# Patient Record
Sex: FEMALE | Race: WHITE | NOT HISPANIC OR LATINO | Employment: FULL TIME
[De-identification: names, ages, dates, MRNs, and addresses within clinical notes are randomized per-mention and may not be internally consistent; named-entity substitution may affect disease eponyms.]

---

## 2017-10-08 ENCOUNTER — HEALTH MAINTENANCE LETTER (OUTPATIENT)
Age: 22
End: 2017-10-08

## 2018-03-30 LAB
C TRACH DNA SPEC QL PROBE+SIG AMP: NORMAL
N GONORRHOEA DNA SPEC QL PROBE+SIG AMP: NORMAL
PAP-ABSTRACT: NORMAL
SPECIMEN DESCRIP: NORMAL
SPECIMEN DESCRIPTION: NORMAL

## 2018-07-09 ENCOUNTER — OFFICE VISIT (OUTPATIENT)
Dept: FAMILY MEDICINE | Facility: CLINIC | Age: 23
End: 2018-07-09
Payer: COMMERCIAL

## 2018-07-09 VITALS
HEIGHT: 66 IN | OXYGEN SATURATION: 100 % | HEART RATE: 92 BPM | RESPIRATION RATE: 20 BRPM | BODY MASS INDEX: 20.31 KG/M2 | TEMPERATURE: 98.4 F | SYSTOLIC BLOOD PRESSURE: 139 MMHG | WEIGHT: 126.4 LBS | DIASTOLIC BLOOD PRESSURE: 92 MMHG

## 2018-07-09 DIAGNOSIS — Z11.3 SCREENING EXAMINATION FOR VENEREAL DISEASE: ICD-10-CM

## 2018-07-09 DIAGNOSIS — F41.0 PANIC ATTACK: ICD-10-CM

## 2018-07-09 DIAGNOSIS — F99 INSOMNIA DUE TO OTHER MENTAL DISORDER: ICD-10-CM

## 2018-07-09 DIAGNOSIS — F51.05 INSOMNIA DUE TO OTHER MENTAL DISORDER: ICD-10-CM

## 2018-07-09 DIAGNOSIS — F90.2 ATTENTION DEFICIT HYPERACTIVITY DISORDER (ADHD), COMBINED TYPE: Primary | ICD-10-CM

## 2018-07-09 LAB
AMPHETAMINES UR QL: ABNORMAL NG/ML
BARBITURATES UR QL SCN: NOT DETECTED NG/ML
BENZODIAZ UR QL SCN: NOT DETECTED NG/ML
BUPRENORPHINE UR QL: NOT DETECTED NG/ML
CANNABINOIDS UR QL: NOT DETECTED NG/ML
COCAINE UR QL SCN: NOT DETECTED NG/ML
D-METHAMPHET UR QL: NOT DETECTED NG/ML
METHADONE UR QL SCN: NOT DETECTED NG/ML
OPIATES UR QL SCN: NOT DETECTED NG/ML
OXYCODONE UR QL SCN: NOT DETECTED NG/ML
PCP UR QL SCN: NOT DETECTED NG/ML
PROPOXYPH UR QL: NOT DETECTED NG/ML
TRICYCLICS UR QL SCN: NOT DETECTED NG/ML

## 2018-07-09 PROCEDURE — 80306 DRUG TEST PRSMV INSTRMNT: CPT | Performed by: NURSE PRACTITIONER

## 2018-07-09 PROCEDURE — 99214 OFFICE O/P EST MOD 30 MIN: CPT | Performed by: NURSE PRACTITIONER

## 2018-07-09 RX ORDER — HYDROXYZINE HYDROCHLORIDE 25 MG/1
25-50 TABLET, FILM COATED ORAL EVERY 6 HOURS PRN
Qty: 60 TABLET | Refills: 1 | Status: SHIPPED | OUTPATIENT
Start: 2018-07-09 | End: 2022-02-09

## 2018-07-09 RX ORDER — DEXTROAMPHETAMINE SACCHARATE, AMPHETAMINE ASPARTATE, DEXTROAMPHETAMINE SULFATE AND AMPHETAMINE SULFATE 7.5; 7.5; 7.5; 7.5 MG/1; MG/1; MG/1; MG/1
30 TABLET ORAL 2 TIMES DAILY
Qty: 60 TABLET | Refills: 0 | Status: SHIPPED | OUTPATIENT
Start: 2018-08-08 | End: 2018-09-07

## 2018-07-09 RX ORDER — DEXTROAMPHETAMINE SACCHARATE, AMPHETAMINE ASPARTATE, DEXTROAMPHETAMINE SULFATE AND AMPHETAMINE SULFATE 5; 5; 5; 5 MG/1; MG/1; MG/1; MG/1
20 TABLET ORAL DAILY
Qty: 30 TABLET | Refills: 0 | Status: SHIPPED | OUTPATIENT
Start: 2018-07-09 | End: 2018-08-08

## 2018-07-09 RX ORDER — DEXTROAMPHETAMINE SACCHARATE, AMPHETAMINE ASPARTATE, DEXTROAMPHETAMINE SULFATE AND AMPHETAMINE SULFATE 5; 5; 5; 5 MG/1; MG/1; MG/1; MG/1
20 TABLET ORAL DAILY
Qty: 30 TABLET | Refills: 0 | Status: SHIPPED | OUTPATIENT
Start: 2018-08-09 | End: 2018-09-08

## 2018-07-09 RX ORDER — DEXTROAMPHETAMINE SACCHARATE, AMPHETAMINE ASPARTATE, DEXTROAMPHETAMINE SULFATE AND AMPHETAMINE SULFATE 7.5; 7.5; 7.5; 7.5 MG/1; MG/1; MG/1; MG/1
30 TABLET ORAL 2 TIMES DAILY
Qty: 60 TABLET | Refills: 0 | Status: SHIPPED | OUTPATIENT
Start: 2018-07-09 | End: 2018-08-08

## 2018-07-09 RX ORDER — ZOLPIDEM TARTRATE 5 MG/1
5 TABLET ORAL
Qty: 30 TABLET | Refills: 1 | Status: SHIPPED | OUTPATIENT
Start: 2018-07-09 | End: 2018-11-14

## 2018-07-09 RX ORDER — DEXTROAMPHETAMINE SACCHARATE, AMPHETAMINE ASPARTATE, DEXTROAMPHETAMINE SULFATE AND AMPHETAMINE SULFATE 5; 5; 5; 5 MG/1; MG/1; MG/1; MG/1
20 TABLET ORAL DAILY
Qty: 30 TABLET | Refills: 0 | Status: SHIPPED | OUTPATIENT
Start: 2018-09-09 | End: 2018-10-09

## 2018-07-09 RX ORDER — DEXTROAMPHETAMINE SACCHARATE, AMPHETAMINE ASPARTATE, DEXTROAMPHETAMINE SULFATE AND AMPHETAMINE SULFATE 7.5; 7.5; 7.5; 7.5 MG/1; MG/1; MG/1; MG/1
30 TABLET ORAL 2 TIMES DAILY
Qty: 60 TABLET | Refills: 0 | Status: SHIPPED | OUTPATIENT
Start: 2018-09-07 | End: 2018-10-07

## 2018-07-09 RX ORDER — ZOLPIDEM TARTRATE 5 MG/1
TABLET ORAL
Refills: 1 | COMMUNITY
Start: 2018-05-27 | End: 2018-07-09

## 2018-07-09 ASSESSMENT — ANXIETY QUESTIONNAIRES
IF YOU CHECKED OFF ANY PROBLEMS ON THIS QUESTIONNAIRE, HOW DIFFICULT HAVE THESE PROBLEMS MADE IT FOR YOU TO DO YOUR WORK, TAKE CARE OF THINGS AT HOME, OR GET ALONG WITH OTHER PEOPLE: VERY DIFFICULT
6. BECOMING EASILY ANNOYED OR IRRITABLE: MORE THAN HALF THE DAYS
2. NOT BEING ABLE TO STOP OR CONTROL WORRYING: NEARLY EVERY DAY
GAD7 TOTAL SCORE: 12
3. WORRYING TOO MUCH ABOUT DIFFERENT THINGS: MORE THAN HALF THE DAYS
7. FEELING AFRAID AS IF SOMETHING AWFUL MIGHT HAPPEN: SEVERAL DAYS
5. BEING SO RESTLESS THAT IT IS HARD TO SIT STILL: NOT AT ALL
1. FEELING NERVOUS, ANXIOUS, OR ON EDGE: MORE THAN HALF THE DAYS

## 2018-07-09 ASSESSMENT — PATIENT HEALTH QUESTIONNAIRE - PHQ9: 5. POOR APPETITE OR OVEREATING: MORE THAN HALF THE DAYS

## 2018-07-09 NOTE — LETTER
My Depression Action Plan  Name: Trixie Hernandez   Date of Birth 1995  Date: 7/9/2018    My doctor: Azar Pa   My clinic: [address]  [place] [id number] [phone]          GREEN    ZONE   Good Control    What it looks like:     Things are going generally well. You have normal up s and down s. You may even feel depressed from time to time, but bad moods usually last less than a day.   What you need to do:  1. Continue to care for yourself (see self care plan)  2. Check your depression survival kit and update it as needed  3. Follow your physician s recommendations including any medication.  4. Do not stop taking medication unless you consult with your physician first.           YELLOW         ZONE Getting Worse    What it looks like:     Depression is starting to interfere with your life.     It may be hard to get out of bed; you may be starting to isolate yourself from others.    Symptoms of depression are starting to last most all day and this has happened for several days.     You may have suicidal thoughts but they are not constant.   What you need to do:     1. Call your care team, your response to treatment will improve if you keep your care team informed of your progress. Yellow periods are signs an adjustment may need to be made.     2. Continue your self-care, even if you have to fake it!    3. Talk to someone in your support network    4. Open up your depression survival kit           RED    ZONE Medical Alert - Get Help    What it looks like:     Depression is seriously interfering with your life.     You may experience these or other symptoms: You can t get out of bed most days, can t work or engage in other necessary activities, you have trouble taking care of basic hygiene, or basic responsibilities, thoughts of suicide or death that will not go away, self-injurious behavior.     What you need to do:  1. Call your care team and  request a same-day appointment. If they are not available (weekends or after hours) call your local crisis line, emergency room or 911.            Depression Self Care Plan / Survival Kit    Self-Care for Depression  Here s the deal. Your body and mind are really not as separate as most people think.  What you do and think affects how you feel and how you feel influences what you do and think. This means if you do things that people who feel good do, it will help you feel better.  Sometimes this is all it takes.  There is also a place for medication and therapy depending on how severe your depression is, so be sure to consult with your medical provider and/ or Behavioral Health Consultant if your symptoms are worsening or not improving.     In order to better manage my stress, I will:    Exercise  Get some form of exercise, every day. This will help reduce pain and release endorphins, the  feel good  chemicals in your brain. This is almost as good as taking antidepressants!  This is not the same as joining a gym and then never going! (they count on that by the way ) It can be as simple as just going for a walk or doing some gardening, anything that will get you moving.      Hygiene   Maintain good hygiene (Get out of bed in the morning, Make your bed, Brush your teeth, Take a shower, and Get dressed like you were going to work, even if you are unemployed).  If your clothes don't fit try to get ones that do.    Diet  I will strive to eat foods that are good for me, drink plenty of water, and avoid excessive sugar, caffeine, alcohol, and other mood-altering substances.  Some foods that are helpful in depression are: complex carbohydrates, B vitamins, flaxseed, fish or fish oil, fresh fruits and vegetables.    Psychotherapy  I agree to participate in Individual Therapy (if recommended).    Medication  If prescribed medications, I agree to take them.  Missing doses can result in serious side effects.  I understand that  drinking alcohol, or other illicit drug use, may cause potential side effects.  I will not stop my medication abruptly without first discussing it with my provider.    Staying Connected With Others  I will stay in touch with my friends, family members, and my primary care provider/team.    Use your imagination  Be creative.  We all have a creative side; it doesn t matter if it s oil painting, sand castles, or mud pies! This will also kick up the endorphins.    Witness Beauty  (AKA stop and smell the roses) Take a look outside, even in mid-winter. Notice colors, textures. Watch the squirrels and birds.     Service to others  Be of service to others.  There is always someone else in need.  By helping others we can  get out of ourselves  and remember the really important things.  This also provides opportunities for practicing all the other parts of the program.    Humor  Laugh and be silly!  Adjust your TV habits for less news and crime-drama and more comedy.    Control your stress  Try breathing deep, massage therapy, biofeedback, and meditation. Find time to relax each day.     My support system    Clinic Contact:  Phone number:    Contact 1:  Phone number:    Contact 2:  Phone number:    [employer]/:  Phone number:    Therapist:  Phone number:    Local crisis center:    Phone number:    Other community support:  Phone number:

## 2018-07-09 NOTE — PROGRESS NOTES
SUBJECTIVE:   Trixie Hernandez is a 23 year old female who presents to clinic today for the following health issues:      Medication Followup of ambien    Taking Medication as prescribed: yes    Side Effects:  None    Medication Helping Symptoms:  yes        Medication Followup of adderall    Taking Medication as prescribed: yes    Side Effects:  None    Medication Helping Symptoms:  yes     She has an appointment with her old psychiatrist to discuss her anxiety.  Personal and family hx of anxiety.  No thoughts of self harm.  Advised pt to follow up sooner if she is worsening before her psych appt.     Pt denies hx of medication abuse/ misuse/ street drug use.  [facility] website checked.       Problem list and histories reviewed & adjusted, as indicated.  Additional history: as documented    Patient Active Problem List   Diagnosis     Anxiety     Specific academic or work inhibition as adjustment reaction     Irregular menses     Intermittent asthma     ADHD (attention deficit hyperactivity disorder)     Mild major depression (H)     Peritoneal abscess (H)     Peritonsillar abscess     Past Surgical History:   Procedure Laterality Date     EYE SURGERY         Social History   Substance Use Topics     Smoking status: Never Smoker     Smokeless tobacco: Never Used      Comment: Non smoking      Alcohol use Yes      Comment: sometimes     Family History   Problem Relation Age of Onset     Asthma Mother      Depression Mother      Thyroid Disease Mother      Osteoperosis Maternal Grandmother      Diabetes Maternal Grandmother          Current Outpatient Prescriptions   Medication Sig Dispense Refill     albuterol (PROAIR HFA, PROVENTIL HFA, VENTOLIN HFA) 108 (90 BASE) MCG/ACT inhaler Inhale 2 puffs into the lungs every 6 hours as needed for shortness of breath / dyspnea 1 Inhaler 1     Amphetamine-Dextroamphetamine (ADDERALL PO) Take 30 mg by mouth 2 times daily       amphetamine-dextroamphetamine (ADDERALL) 20 MG per  tablet Take 1 tablet (20 mg) by mouth daily In the evening. Take as prescribed, can be habit forming 30 tablet 0     [START ON 8/9/2018] amphetamine-dextroamphetamine (ADDERALL) 20 MG per tablet Take 1 tablet (20 mg) by mouth daily In the evening. Take as prescribed, can be habit forming 30 tablet 0     [START ON 9/9/2018] amphetamine-dextroamphetamine (ADDERALL) 20 MG per tablet Take 1 tablet (20 mg) by mouth daily In the evening. Take as prescribed, can be habit forming 30 tablet 0     amphetamine-dextroamphetamine (ADDERALL) 20 MG tablet Take 20 mg by mouth every evening        amphetamine-dextroamphetamine (ADDERALL) 30 MG per tablet Take 1 tablet (30 mg) by mouth 2 times daily Take as prescribed, can be habit forming 60 tablet 0     [START ON 8/8/2018] amphetamine-dextroamphetamine (ADDERALL) 30 MG per tablet Take 1 tablet (30 mg) by mouth 2 times daily Take as prescribed, can be habit forming 60 tablet 0     [START ON 9/7/2018] amphetamine-dextroamphetamine (ADDERALL) 30 MG per tablet Take 1 tablet (30 mg) by mouth 2 times daily Take as prescribed, can be habit forming 60 tablet 0     hydrOXYzine (ATARAX) 25 MG tablet Take 1-2 tablets (25-50 mg) by mouth every 6 hours as needed for anxiety 60 tablet 1     norgestim-eth estrad triphasic (TRINESSA, 28,) 0.18/0.215/0.25 MG-35 MCG TABS tablet Take 1 tablet by mouth daily 84 tablet 4     zolpidem (AMBIEN) 5 MG tablet Take 1 tablet (5 mg) by mouth nightly as needed for sleep (can be habit forming) 30 tablet 1     No Known Allergies  BP Readings from Last 3 Encounters:   07/09/18 (!) 139/92   06/15/16 116/72   08/11/15 129/73    Wt Readings from Last 3 Encounters:   07/09/18 126 lb 6.4 oz (57.3 kg)   06/15/16 127 lb (57.6 kg)   08/10/15 138 lb 14.2 oz (63 kg)                  Labs reviewed in EPIC    Reviewed and updated as needed this visit by clinical staff  Tobacco  Allergies  Meds  Med Hx  Surg Hx  Fam Hx  Soc Hx      Reviewed and updated as needed this  "visit by Provider         ROS:  Constitutional, HEENT, cardiovascular, pulmonary, GI, , musculoskeletal, neuro, skin, endocrine and psych systems are negative, except as otherwise noted.    OBJECTIVE:     BP (!) 139/92  Pulse 92  Temp 98.4  F (36.9  C) (Oral)  Resp 20  Ht 5' 6.34\" (1.685 m)  Wt 126 lb 6.4 oz (57.3 kg)  LMP 06/13/2018 (Approximate)  SpO2 100%  Breastfeeding? No  BMI 20.19 kg/m2  Body mass index is 20.19 kg/(m^2).  GENERAL: healthy, alert and no distress  NECK: no adenopathy, no asymmetry, masses, or scars and thyroid normal to palpation  RESP: lungs clear to auscultation - no rales, rhonchi or wheezes  CV: regular rate and rhythm, normal S1 S2, no S3 or S4, no murmur, click or rub, no peripheral edema and peripheral pulses strong  PSYCH: mentation appears normal, affect normal/bright    Diagnostic Test Results:  See orders    ASSESSMENT/PLAN:         ICD-10-CM    1. Attention deficit hyperactivity disorder (ADHD), combined type F90.2 Drug Abuse Screen Panel 13, Urine (Pain Care Package)     amphetamine-dextroamphetamine (ADDERALL) 30 MG per tablet     amphetamine-dextroamphetamine (ADDERALL) 30 MG per tablet     amphetamine-dextroamphetamine (ADDERALL) 30 MG per tablet     amphetamine-dextroamphetamine (ADDERALL) 20 MG per tablet     amphetamine-dextroamphetamine (ADDERALL) 20 MG per tablet     amphetamine-dextroamphetamine (ADDERALL) 20 MG per tablet    on RX controlled substances   2. Screening examination for venereal disease Z11.3     per national standards   3. Insomnia due to other mental disorder F51.05 zolpidem (AMBIEN) 5 MG tablet    F99    4. Panic attack F41.0 hydrOXYzine (ATARAX) 25 MG tablet       See Patient Instructions: Follow up in 3 months, sooner if needed.     Mago Dalton [facility] [place]  "

## 2018-07-09 NOTE — LETTER
My Asthma Action Plan  Name: Trixie Hernandez   YOB: 1995  Date: 7/9/2018   My doctor: Mago Dalton NP   My clinic: [facility]        My Control Medicine:   My Rescue Medicine:    My Asthma Severity:   Avoid your asthma triggers:                GREEN ZONE   Good Control    I feel good    No cough or wheeze    Can work, sleep and play without asthma symptoms       Take your asthma control medicine every day.     1. If exercise triggers your asthma, take your rescue medication    15 minutes before exercise or sports, and    During exercise if you have asthma symptoms  2. Spacer to use with inhaler: If you have a spacer, make sure to use it with your inhaler             YELLOW ZONE Getting Worse  I have ANY of these:    I do not feel good    Cough or wheeze    Chest feels tight    Wake up at night   1. Keep taking your Green Zone medications  2. Start taking your rescue medicine:    every 20 minutes for up to 1 hour. Then every 4 hours for 24-48 hours.  3. If you stay in the Yellow Zone for more than 12-24 hours, contact your doctor.  4. If you do not return to the Green Zone in 12-24 hours or you get worse, start taking your oral steroid medicine if prescribed by your provider.           RED ZONE Medical Alert - Get Help  I have ANY of these:    I feel awful    Medicine is not helping    Breathing getting harder    Trouble walking or talking    Nose opens wide to breathe       1. Take your rescue medicine NOW  2. If your provider has prescribed an oral steroid medicine, start taking it NOW  3. Call your doctor NOW  4. If you are still in the Red Zone after 20 minutes and you have not reached your doctor:    Take your rescue medicine again and    Call 911 or go to the emergency room right away    See your regular doctor within 2 weeks of an Emergency Room or Urgent Care visit for follow-up treatment.          Annual Reminders:  Meet with Asthma Educator,  Flu Shot in the Fall, consider  Pneumonia Vaccination for patients with asthma (aged 19 and older).    Pharmacy:    [facility] [place] - [address] [facility] - [place] - [address] [place] AT SEC OF [facility] [id number] - [place] - [address] [place] AT SEC OF [facility]                      Asthma Triggers  How To Control Things That Make Your Asthma Worse    Triggers are things that make your asthma worse.  Look at the list below to help you find your triggers and what you can do about them.  You can help prevent asthma flare-ups by staying away from your triggers.      Trigger                                                          What you can do   Cigarette Smoke  Tobacco smoke can make asthma worse. Do not allow smoking in your home, car or around you.  Be sure no one smokes at a child s day care or school.  If you smoke, ask your health care provider for ways to help you quit.  Ask family members to quit too.  Ask your health care provider for a referral to Quit Plan to help you quit smoking, or call 3-343-459-PLAN.     Colds, Flu, Bronchitis  These are common triggers of asthma. Wash your hands often.  Don t touch your eyes, nose or mouth.  Get a flu shot every year.     Dust Mites  These are tiny bugs that live in cloth or carpet. They are too small to see. Wash sheets and blankets in hot water every week.   Encase pillows and mattress in dust mite proof covers.  Avoid having carpet if you can. If you have carpet, vacuum weekly.   Use a dust mask and HEPA vacuum.   Pollen and Outdoor Mold  Some people are allergic to trees, grass, or weed pollen, or molds. Try to keep your windows closed.  Limit time out doors when pollen count is high.   Ask you health care provider about taking medicine during allergy season.     Animal Dander  Some people are allergic to skin flakes, urine or saliva from pets with fur or feathers.  Keep pets with fur or feathers out of your home.    If you can t keep the pet outdoors, then keep the pet out of your bedroom.  Keep the bedroom door closed.  Keep pets off cloth furniture and away from stuffed toys.     Mice, Rats, and Cockroaches  Some people are allergic to the waste from these pests.   Cover food and garbage.  Clean up spills and food crumbs.  Store grease in the refrigerator.   Keep food out of the bedroom.   Indoor Mold  This can be a trigger if your home has high moisture. Fix leaking faucets, pipes, or other sources of water.   Clean moldy surfaces.  Dehumidify basement if it is damp and smelly.   Smoke, Strong Odors, and Sprays  These can reduce air quality. Stay away from strong odors and sprays, such as perfume, powder, hair spray, paints, smoke incense, paint, cleaning products, candles and new carpet.   Exercise or Sports  Some people with asthma have this trigger. Be active!  Ask your doctor about taking medicine before sports or exercise to prevent symptoms.    Warm up for 5-10 minutes before and after sports or exercise.     Other Triggers of Asthma  Cold air:  Cover your nose and mouth with a scarf.  Sometimes laughing or crying can be a trigger.  Some medicines and food can trigger asthma.

## 2018-07-09 NOTE — PATIENT INSTRUCTIONS
"Follow up in 3 months, sooner if needed.     Mental Health Crisis Numbers  [facility]  If you have a mental health or substance abuse crisis on a weekend or after hours, please use any of the resources below.  General numbers  911 emergency services  211 First Call for Help  [facility]  [address] [place] [id number] [phone]  Crisis Connection Hotline  985.917.4938 or 1-751.648.3690  National Suicide Prevention Lifeline  5-567-809-TALK (5659)  MDP3PMFS  Text crisis line for teens. Text \"LIFE\" to 310190  [facility] mobile crisis services  These services will come to you. Call the county where the child is physically located.    [place] (adult only): [phone]    [place] (child and adult): [phone]    [place] (child and adult): [phone]    [place] (child): [phone]    [place] (adult): [phone]    [place] (child): [phone]    [place] (Adult): [phone]    [place] (child and adult): [phone]    [place] (child and adult): [phone] or 1-157.541.8920  Other crisis resources (not mobile)  [facility]  4-005-094-8886  [place] Crisis Hotline  708.781.3430 or 1-361.322.4288  [facility]ute Psychiatric Services (formerly known as the Crisis Intervention Center)  Offers walk-in and telephone crisis intervention services for adults.  [facility]  [address] [place] [address] [phone] or 376-331-1378 (suicide hotline)  Short-term residential crisis resources  [facility] (ages 10 to 17)  [address], [place] [id number] [phone]  Suggested inpatient hospitalization   [facility], [address] [address]  [phone]  For informational purposes only. Not to replace the advice of your health care " provider.  Copyright   2014 [facility]. All rights reserved. [employer] 521645   REV 09/15.

## 2018-07-09 NOTE — LETTER
[facility]    07/09/18    Patient: Trixie Hernandez  YOB: 1995  Medical Record Number: [id number]                                                                  Controlled Substance Agreement  I understand that my care provider has prescribed controlled substances (narcotics, tranquilizers, and/or stimulants) to help manage my condition(s).  I am taking this medicine to help me function or work.  I know that this is strong medicine.  It could have serious side effects and even cause a dependency on the drug.  If I stop these medicines suddenly, I could have severe withdrawal symptoms.    The risks, benefits, and side effects of these medication(s) were explained to me.  I agree that:  1. I will take part in other treatments as advised by my provider.  This may be psychiatry or counseling, physical therapy, behavioral therapy, group treatment, or a referral to a pain clinic.  I will reduce or stop my medicine when my provider tells me to do so.   2. I will take my medicines as prescribed.  I will not change the dose or schedule unless my provider tells me to.  There will be no refills if I  run out early.   I may be contacted at any time without warning and asked to complete a drug test or pill count.   3. I will keep all my appointments at the clinic.  If I miss appointments or fail to follow instructions, my provider may stop my medicine.  4. I will not ask other providers to prescribe controlled substances. And I will not accept controlled substances from other people. If I need another prescribed controlled substance for a new reason, I will notify my provider within one business day.  5. If I enroll in the [place] Medical Marijuana program, I will tell my provider.  I will also sign an agreement to share my medical records with my provider.  6. I will use one pharmacy to fill all of my controlled substance prescriptions.  If my prescription is mailed to my pharmacy, it may take 5  to 7 days for my medicine to be ready.  7. I understand that my provider, clinic care team, and pharmacy can track controlled substance prescriptions from other providers through a central database (prescription monitoring program).  8. I will bring in my list of medications (or my medicine bottles) each time I come to the clinic.  REV- 04/2016                                                                                                                                            Page 1 of 2      [facility] [place]    07/09/18    Patient: Trixie Hernandez  YOB: 1995  Medical Record Number: [id number]    9. Refills of controlled substances will be made only during office hours.  It is up to me to make sure that I do not run out of my medicines on weekends or holidays.    10. I am responsible for my prescriptions.  If the medicine/prescription is lost or stolen, it will not be replaced.   I also agree not to share these medicines with anyone.  11. I agree to not use ANY illegal or recreational drugs.  This includes marijuana, cocaine, bath salts or other drugs.  I agree not to use alcohol unless my provider says I may.  I agree to give urine samples whenever asked.  If I fail to give a urine sample, the provider may stop my medicine.     12. I will tell my nurse or provider right away if I become pregnant or have a new medical problem treated outside of [facility].  13. I understand that this medicine can affect my thinking and judgment.  It may be unsafe for me to drive, use machinery and do dangerous tasks.  I will not do any of these things until I know how the medicine affects me.  If my dose changes, I will wait to see how it affects me.  I will contact my provider if I have concerns about medicine side effects.  I understand that if I do not follow any of the conditions above, my prescriptions or treatment may be stopped.    I agree that my provider, clinic care team, and pharmacy may  work with any city, state or federal law enforcement agency that investigates the misuse, sale, or other diversion of my controlled medicine. I will allow my provider to discuss my care with or share a copy of this agreement with any other treating provider, pharmacy or emergency room where I receive care.  I agree to give up (waive) any right of privacy or confidentiality with respect to these authorizations.   I have read this agreement and have asked questions about anything I did not understand.   ___________________________________    ___________________________  Patient Signature                                                           Date and Time  ___________________________________     ____________________________  Witness                                                                            Date and Time  ___________________________________  Mago Dalton NP  REV-  04/2016                                                                                                                                                                 Page 2 of 2

## 2018-07-09 NOTE — NURSING NOTE
"Chief Complaint   Patient presents with     Anxiety     Imm/Inj       Initial BP (!) 139/92  Pulse 92  Temp 98.4  F (36.9  C) (Oral)  Resp 20  Ht 5' 6.34\" (1.685 m)  Wt 126 lb 6.4 oz (57.3 kg)  LMP 06/13/2018 (Approximate)  SpO2 100%  Breastfeeding? No  BMI 20.19 kg/m2 Estimated body mass index is 20.19 kg/(m^2) as calculated from the following:    Height as of this encounter: 5' 6.34\" (1.685 m).    Weight as of this encounter: 126 lb 6.4 oz (57.3 kg).  Medication Reconciliation: complete     Kerline Martinez MA  "

## 2018-07-09 NOTE — MR AVS SNAPSHOT
"              After Visit Summary   7/9/2018    Trixie Hernandez    MRN: [id number]           Patient Information     Date Of Birth          1995        Visit Information        Provider Department      7/9/2018 2:20 PM Mago Dalton NP [facility]        Today's Diagnoses     Screening examination for venereal disease    -  1    Attention deficit hyperactivity disorder (ADHD), combined type        Insomnia due to other mental disorder        Panic attack          Care Instructions    Follow up in 3 months, sooner if needed.     Mental Health Crisis Numbers  [facility]  If you have a mental health or substance abuse crisis on a weekend or after hours, please use any of the resources below.  General numbers  911 emergency services  211 First Call for Help  [facility]  [address] [place] [address]  [phone]  Crisis Connection Hotline  768.602.3101 or 1-242.537.5447  National Suicide Prevention Lifeline  8-087-073-TALK (7387)  GGI8NZAF  Text crisis line for teens. Text \"LIFE\" to 538021  [facility] mobile crisis services  These services will come to you. Call the county where the child is physically located.    [place] (adult only): [phone]    [place] (child and adult): [phone]    [place] (child and adult): [phone]    [place] (child): [phone]    [place] (adult): [phone]    [place] (child): [phone]    [place] (Adult): [phone]    [place] (child and adult): [phone]    [place] (child and adult): [phone] or [phone]  Other crisis resources (not mobile)  [facility]  5-700-432-0642  [place] Crisis Hotline  913.767.7000 or 1-390.441.4007  [facility]ute Psych[facility] Services (formerly known as the Crisis Intervention Center)  Offers walk-in and telephone crisis intervention services for " adults.  [facility]  [address] [place] [address]  [phone] or 442-237-1717 (suicide hotline)  Short-term residential crisis resources  [facility] (ages 10 to 17)  [address] [place] [address] [phone]  Suggested inpatient hospitalization   [facility]  [address] [place] [address]  [phone]  For informational purposes only. Not to replace the advice of your health care provider.  Copyright   2014 [facility] [employer] [facility]. All rights reserved. CarePayment [id number] - REV 09/15.            Follow-ups after your visit        Follow-up notes from your care team     Return if symptoms worsen or fail to improve.      Who to contact     Normal or non-critical lab and imaging results will be communicated to you by Livra Panelshart, letter or phone within 4 business days after the clinic has received the results. If you do not hear from us within 7 days, please contact the clinic through Livra Panelshart or phone. If you have a critical or abnormal lab result, we will notify you by phone as soon as possible.  Submit refill requests through Verimed or call your pharmacy and they will forward the refill request to us. Please allow 3 business days for your refill to be completed.          If you need to speak with a  for additional information , please call: [phone]             Additional Information About Your Visit        Verimed Information     Verimed gives you secure access to your electronic health record. If you see a primary care provider, you can also send messages to your care team and make appointments. If you have questions, please call your primary care clinic.  If you do not have a primary care provider, please call [phone] and they will assist you.        Care EveryWhere ID     This is your Care EveryWhere ID. This could be used by other organizations to access your [place]  "medical records  [id number]        Your Vitals Were     Pulse Temperature Respirations Height Last Period Pulse Oximetry    92 98.4  F (36.9  C) (Oral) 20 5' 6.34\" (1.685 m) 06/13/2018 (Approximate) 100%    Breastfeeding? BMI (Body Mass Index)                No 20.19 kg/m2           Blood Pressure from Last 3 Encounters:   07/09/18 (!) 139/92   06/15/16 116/72   08/11/15 129/73    Weight from Last 3 Encounters:   07/09/18 126 lb 6.4 oz (57.3 kg)   06/15/16 127 lb (57.6 kg)   08/10/15 138 lb 14.2 oz (63 kg)              We Performed the Following     ABSTRACT PAP-NO CHARGE     Asthma Action Plan (AAP)     Chlamydia trachomatis PCR     DEPRESSION ACTION PLAN (DAP)     Drug Abuse Screen Panel 13, Urine (Pain Care Package)     Neisseria gonorrhoeae PCR          Today's Medication Changes          These changes are accurate as of 7/9/18  2:43 PM.  If you have any questions, ask your nurse or doctor.               Start taking these medicines.        Dose/Directions    hydrOXYzine 25 MG tablet   Commonly known as:  ATARAX   Used for:  Panic attack   Started by:  Mago Dalton NP        Dose:  25-50 mg   Take 1-2 tablets (25-50 mg) by mouth every 6 hours as needed for anxiety   Quantity:  60 tablet   Refills:  1         These medicines have changed or have updated prescriptions.        Dose/Directions    * ADDERALL 20 MG per tablet   This may have changed:  Another medication with the same name was added. Make sure you understand how and when to take each.   Generic drug:  amphetamine-dextroamphetamine   Changed by:  Mago Dalton NP        Dose:  20 mg   Take 20 mg by mouth every evening   Refills:  0       * ADDERALL PO   This may have changed:  Another medication with the same name was added. Make sure you understand how and when to take each.   Changed by:  Mago Dalton NP        Dose:  30 mg   Take 30 mg by mouth 2 times daily   Refills:  0       * amphetamine-dextroamphetamine 30 MG per tablet   Commonly " known as:  ADDERALL   This may have changed:  You were already taking a medication with the same name, and this prescription was added. Make sure you understand how and when to take each.   Used for:  Attention deficit hyperactivity disorder (ADHD), combined type   Changed by:  Mago Dalton NP        Dose:  30 mg   Take 1 tablet (30 mg) by mouth 2 times daily Take as prescribed, can be habit forming   Quantity:  60 tablet   Refills:  0       * amphetamine-dextroamphetamine 20 MG per tablet   Commonly known as:  ADDERALL   This may have changed:  You were already taking a medication with the same name, and this prescription was added. Make sure you understand how and when to take each.   Used for:  Attention deficit hyperactivity disorder (ADHD), combined type   Changed by:  Mago Dalton NP        Dose:  20 mg   Take 1 tablet (20 mg) by mouth daily In the evening. Take as prescribed, can be habit forming   Quantity:  30 tablet   Refills:  0       * amphetamine-dextroamphetamine 30 MG per tablet   Commonly known as:  ADDERALL   This may have changed:  You were already taking a medication with the same name, and this prescription was added. Make sure you understand how and when to take each.   Used for:  Attention deficit hyperactivity disorder (ADHD), combined type   Changed by:  Mago Dalton NP        Dose:  30 mg   Start taking on:  8/8/2018   Take 1 tablet (30 mg) by mouth 2 times daily Take as prescribed, can be habit forming   Quantity:  60 tablet   Refills:  0       * amphetamine-dextroamphetamine 20 MG per tablet   Commonly known as:  ADDERALL   This may have changed:  You were already taking a medication with the same name, and this prescription was added. Make sure you understand how and when to take each.   Used for:  Attention deficit hyperactivity disorder (ADHD), combined type   Changed by:  Mago Dalton NP        Dose:  20 mg   Start taking on:  8/9/2018   Take 1 tablet (20 mg) by mouth  daily In the evening. Take as prescribed, can be habit forming   Quantity:  30 tablet   Refills:  0       * amphetamine-dextroamphetamine 30 MG per tablet   Commonly known as:  ADDERALL   This may have changed:  You were already taking a medication with the same name, and this prescription was added. Make sure you understand how and when to take each.   Used for:  Attention deficit hyperactivity disorder (ADHD), combined type   Changed by:  Mago Dalton NP        Dose:  30 mg   Start taking on:  9/7/2018   Take 1 tablet (30 mg) by mouth 2 times daily Take as prescribed, can be habit forming   Quantity:  60 tablet   Refills:  0       * amphetamine-dextroamphetamine 20 MG per tablet   Commonly known as:  ADDERALL   This may have changed:  You were already taking a medication with the same name, and this prescription was added. Make sure you understand how and when to take each.   Used for:  Attention deficit hyperactivity disorder (ADHD), combined type   Changed by:  Mago Dalton NP        Dose:  20 mg   Start taking on:  9/9/2018   Take 1 tablet (20 mg) by mouth daily In the evening. Take as prescribed, can be habit forming   Quantity:  30 tablet   Refills:  0       zolpidem 5 MG tablet   Commonly known as:  AMBIEN   This may have changed:  See the new instructions.   Used for:  Insomnia due to other mental disorder   Changed by:  Mago Dalton NP        Dose:  5 mg   Take 1 tablet (5 mg) by mouth nightly as needed for sleep (can be habit forming)   Quantity:  30 tablet   Refills:  1       * Notice:  This list has 8 medication(s) that are the same as other medications prescribed for you. Read the directions carefully, and ask your doctor or other care provider to review them with you.         Where to get your medicines      These medications were sent to [facility] [address] [facility] [address] [facility] [place] [address] [address], [place] [address]      Phone:  [phone]     hydrOXYzine 25 MG tablet         Some of these will need a paper prescription and others can be bought over the counter.  Ask your nurse if you have questions.     Bring a paper prescription for each of these medications     amphetamine-dextroamphetamine 20 MG per tablet    amphetamine-dextroamphetamine 20 MG per tablet    amphetamine-dextroamphetamine 20 MG per tablet    amphetamine-dextroamphetamine 30 MG per tablet    amphetamine-dextroamphetamine 30 MG per tablet    amphetamine-dextroamphetamine 30 MG per tablet    zolpidem 5 MG tablet                Primary Care Provider Office Phone # Fax #    [facility] [place] [phone] [address] [facility] [address]        Equal Access to Services     ARELY HERNÁNDEZ : Hadii rosales Aguayo, waaxda tootie, qaybta kaalmada kenneth, santosh johnston. So [facility] [phone].    ATENCIÓN: Si habla español, tiene a wall disposición servicios gratuitos de asistencia lingüística. [facility] [phone].    We comply with applicable federal civil rights laws and [place] laws. We do not discriminate on the basis of race, color, national origin, age, disability, sex, sexual orientation, or gender identity.            Thank you!     Thank you for choosing [facility]  for your care. Our goal is always to provide you with excellent care. Hearing back from our patients is one way we can continue to improve our services. Please take a few minutes to complete the written survey that you may receive in the mail after your visit with us. Thank you!             Your Updated Medication List - Protect others around you: Learn how to safely use, store and throw away your medicines at www.disposemymeds.org.          This list is accurate as of 7/9/18  2:43 PM.  Always use your most recent med list.                   Brand Name Dispense Instructions for use Diagnosis    * ADDERALL 20 MG per  tablet   Generic drug:  amphetamine-dextroamphetamine      Take 20 mg by mouth every evening        * ADDERALL PO      Take 30 mg by mouth 2 times daily        * amphetamine-dextroamphetamine 30 MG per tablet    ADDERALL    60 tablet    Take 1 tablet (30 mg) by mouth 2 times daily Take as prescribed, can be habit forming    Attention deficit hyperactivity disorder (ADHD), combined type       * amphetamine-dextroamphetamine 20 MG per tablet    ADDERALL    30 tablet    Take 1 tablet (20 mg) by mouth daily In the evening. Take as prescribed, can be habit forming    Attention deficit hyperactivity disorder (ADHD), combined type       * amphetamine-dextroamphetamine 30 MG per tablet   Start taking on:  8/8/2018    ADDERALL    60 tablet    Take 1 tablet (30 mg) by mouth 2 times daily Take as prescribed, can be habit forming    Attention deficit hyperactivity disorder (ADHD), combined type       * amphetamine-dextroamphetamine 20 MG per tablet   Start taking on:  8/9/2018    ADDERALL    30 tablet    Take 1 tablet (20 mg) by mouth daily In the evening. Take as prescribed, can be habit forming    Attention deficit hyperactivity disorder (ADHD), combined type       * amphetamine-dextroamphetamine 30 MG per tablet   Start taking on:  9/7/2018    ADDERALL    60 tablet    Take 1 tablet (30 mg) by mouth 2 times daily Take as prescribed, can be habit forming    Attention deficit hyperactivity disorder (ADHD), combined type       * amphetamine-dextroamphetamine 20 MG per tablet   Start taking on:  9/9/2018    ADDERALL    30 tablet    Take 1 tablet (20 mg) by mouth daily In the evening. Take as prescribed, can be habit forming    Attention deficit hyperactivity disorder (ADHD), combined type       albuterol 108 (90 Base) MCG/ACT Inhaler    PROAIR HFA/PROVENTIL HFA/VENTOLIN HFA    1 Inhaler    Inhale 2 puffs into the lungs every 6 hours as needed for shortness of breath / dyspnea    Intermittent asthma, uncomplicated        hydrOXYzine 25 MG tablet    ATARAX    60 tablet    Take 1-2 tablets (25-50 mg) by mouth every 6 hours as needed for anxiety    Panic attack       norgestim-eth estrad triphasic 0.18/0.215/0.25 MG-35 MCG per tablet    TRINESSA (28)    84 tablet    Take 1 tablet by mouth daily    Contraception       zolpidem 5 MG tablet    AMBIEN    30 tablet    Take 1 tablet (5 mg) by mouth nightly as needed for sleep (can be habit forming)    Insomnia due to other mental disorder       * Notice:  This list has 8 medication(s) that are the same as other medications prescribed for you. Read the directions carefully, and ask your doctor or other care provider to review them with you.

## 2018-07-10 ASSESSMENT — ASTHMA QUESTIONNAIRES: ACT_TOTALSCORE: 21

## 2018-07-10 ASSESSMENT — PATIENT HEALTH QUESTIONNAIRE - PHQ9: SUM OF ALL RESPONSES TO PHQ QUESTIONS 1-9: 4

## 2018-07-10 ASSESSMENT — ANXIETY QUESTIONNAIRES: GAD7 TOTAL SCORE: 12

## 2018-11-14 DIAGNOSIS — F51.05 INSOMNIA DUE TO OTHER MENTAL DISORDER: ICD-10-CM

## 2018-11-14 DIAGNOSIS — F99 INSOMNIA DUE TO OTHER MENTAL DISORDER: ICD-10-CM

## 2018-11-15 RX ORDER — ZOLPIDEM TARTRATE 5 MG/1
5 TABLET ORAL
Qty: 30 TABLET | Refills: 1 | Status: SHIPPED | OUTPATIENT
Start: 2018-11-15 | End: 2019-11-12

## 2018-11-15 NOTE — TELEPHONE ENCOUNTER
Requested Prescriptions   Pending Prescriptions Disp Refills     zolpidem (AMBIEN) 5 MG tablet 30 tablet 1     Sig: Take 1 tablet (5 mg) by mouth nightly as needed for sleep (can be habit forming)  Zolpidem      Last Written Prescription Date:  9/14/18  Last Fill Quantity: 30,   # refills: 0  Last Office Visit: 7/9/18 Krystle  Future Office visit:       Routing refill request to provider for review/approval because:  Drug not on the [facility] or [facility] refill protocol or controlled substance    There is no refill protocol information for this order

## 2019-10-11 ENCOUNTER — TELEPHONE (OUTPATIENT)
Dept: FAMILY MEDICINE | Facility: CLINIC | Age: 24
End: 2019-10-11

## 2019-11-12 DIAGNOSIS — F99 INSOMNIA DUE TO OTHER MENTAL DISORDER: ICD-10-CM

## 2019-11-12 DIAGNOSIS — F51.05 INSOMNIA DUE TO OTHER MENTAL DISORDER: ICD-10-CM

## 2019-11-12 NOTE — TELEPHONE ENCOUNTER
Bladderscan performed in office today:  Pt voided - 100 mL, PVR - 0 mL Requested Prescriptions   Pending Prescriptions Disp Refills     zolpidem (AMBIEN) 5 MG tablet [Pharmacy Med Name: ZOLPIDEM 5MG TABLETS] 30 tablet 0     Sig: TAKE 1 TABLET BY MOUTH EVERY NIGHT AS NEEDED FOR SLEEP( CAN BE HABIT FORMING)  Ambien      Last Written Prescription Date:  7/10/19  Last Fill Quantity: 30,   # refills: 0  Last Office Visit: 7/9/19 Krystle  Future Office visit:       Routing refill request to provider for review/approval because:  Drug not on the [facility], P or [facility] refill protocol or controlled substance       There is no refill protocol information for this order

## 2019-11-14 RX ORDER — ZOLPIDEM TARTRATE 5 MG/1
TABLET ORAL
Qty: 30 TABLET | Refills: 0 | Status: SHIPPED | OUTPATIENT
Start: 2019-11-14 | End: 2022-02-09

## 2020-02-24 ENCOUNTER — HEALTH MAINTENANCE LETTER (OUTPATIENT)
Age: 25
End: 2020-02-24

## 2020-03-26 ENCOUNTER — HOSPITAL ENCOUNTER (OUTPATIENT)
Dept: BEHAVIORAL HEALTH | Facility: CLINIC | Age: 25
Discharge: HOME | End: 2020-03-26
Attending: FAMILY MEDICINE | Admitting: FAMILY MEDICINE
Payer: COMMERCIAL

## 2020-03-26 ENCOUNTER — MYC REFILL (OUTPATIENT)
Dept: FAMILY MEDICINE | Facility: CLINIC | Age: 25
End: 2020-03-26

## 2020-03-26 VITALS — WEIGHT: 145 LBS | BODY MASS INDEX: 23.3 KG/M2 | HEIGHT: 66 IN

## 2020-03-26 DIAGNOSIS — F51.05 INSOMNIA DUE TO OTHER MENTAL DISORDER: ICD-10-CM

## 2020-03-26 DIAGNOSIS — F99 INSOMNIA DUE TO OTHER MENTAL DISORDER: ICD-10-CM

## 2020-03-26 PROCEDURE — H0001 ALCOHOL AND/OR DRUG ASSESS: HCPCS | Mod: HF | Performed by: COUNSELOR

## 2020-03-26 RX ORDER — SERTRALINE HYDROCHLORIDE 100 MG/1
TABLET, FILM COATED ORAL
COMMUNITY
Start: 2020-02-13 | End: 2022-02-09

## 2020-03-26 RX ORDER — ZOLPIDEM TARTRATE 5 MG/1
TABLET ORAL
Qty: 30 TABLET | Refills: 0 | Status: CANCELLED | OUTPATIENT
Start: 2020-03-26

## 2020-03-26 RX ORDER — MIRTAZAPINE 15 MG/1
TABLET, FILM COATED ORAL
COMMUNITY
Start: 2019-12-13 | End: 2022-02-09

## 2020-03-26 RX ORDER — PROPRANOLOL HYDROCHLORIDE 10 MG/1
TABLET ORAL
COMMUNITY
Start: 2020-03-10 | End: 2022-02-09

## 2020-03-26 ASSESSMENT — PATIENT HEALTH QUESTIONNAIRE - PHQ9: SUM OF ALL RESPONSES TO PHQ QUESTIONS 1-9: 11

## 2020-03-26 ASSESSMENT — ANXIETY QUESTIONNAIRES
GAD7 TOTAL SCORE: 7
3. WORRYING TOO MUCH ABOUT DIFFERENT THINGS: SEVERAL DAYS
4. TROUBLE RELAXING: NOT AT ALL
5. BEING SO RESTLESS THAT IT IS HARD TO SIT STILL: NOT AT ALL
1. FEELING NERVOUS, ANXIOUS, OR ON EDGE: SEVERAL DAYS
2. NOT BEING ABLE TO STOP OR CONTROL WORRYING: NEARLY EVERY DAY
7. FEELING AFRAID AS IF SOMETHING AWFUL MIGHT HAPPEN: SEVERAL DAYS
6. BECOMING EASILY ANNOYED OR IRRITABLE: SEVERAL DAYS

## 2020-03-26 ASSESSMENT — PAIN SCALES - GENERAL: PAINLEVEL: NO PAIN (0)

## 2020-03-26 ASSESSMENT — MIFFLIN-ST. JEOR: SCORE: 1419.47

## 2020-03-26 NOTE — PROGRESS NOTES
"[facility]  [address] [place] [id number]                ADULT CD ASSESSMENT ADDENDUM      Patient Name: Trixie Hernandez  Cell Phone: [phone]   Email:  [email or web address]    Emergency Contact: Eliseo Hernandez (dad)  Tel: [phone]    The patient reported being:  Single, no serious involvement    With which race do you identify? White    Initial Screening Questions     1. Are you currently having severe withdrawal symptoms that are putting yourself or others in danger?  No    2. Are you currently having severe medical problems that require immediate attention?  No    3. Are you currently having severe emotional or behavioral problems that are putting yourself or others at risk of harm?  No    4. Do you have sufficient reading skills that will enable you to understand written materials, including the program rules and client rights materials?  Yes     Family History and other additional information     Who raised you? (parents, grandparents, adoptive parents, step-parents, etc.)    Both Parents    Please tell me what it was like growing up in your family. (please include any history of substance abuse, mental health issues, emotional/physical/sexual abuse, forms of discipline, and support)     \"I had the best parents. We had multiple houses. My parents worked a lot. I had a lot of friends. Played a lot of sports. Popular. There wasn't anything bad. I have a half brother who is 10 years older. Dad is 30 years sober. Really supportive and he gets it. They sat me down and they sat me down and don't want me to go down the same path. I have really cut down a lot.\"    Do you have any children or Stepchildren? No    Are you being investigated by Child Protection Services? No    Do you have a child protection worker, probation office or ?  No    How would you describe your current finances?  Some money problems    If you are having problems, (unpaid bills, bankruptcy, IRS " "problems) please explain:  Yes, explain: no income. She relies on her parents.    If working or a student are you able to function appropriately in that setting? Yes     Describe your preferred learning style:  by hands-on practice    What are your some of your personal strengths?  \"I've always been the listener of the group. The secret keeper. I probably put other people's problems before mine. Everyone calls me the loyal one.\"    Do you currently participate in community christian activities, such as attending Manda[employer], temple, Faith or [employer]ze services?  The patient denied currently being involved in any community christian activities.    How does your spirituality impact your recovery?  \"nothing\"    Do you currently self-administer your medications?  Yes    Have you ever had to lie to people important to you about how much you brantley?   No   Have you ever felt the need to bet more and more money?   No   Have you ever attempted treatment for a gambling problem?   No   Have you ever touched or fondled someone else inappropriately or forced them to have sex with you against their will?   No   Are you or have you ever been a registered sex offender?   No   Is there any history of sexual abuse in your family? No   Have you ever felt obsessed by your sexual behavior, such as having sex with many partners, masturbating often, using pornography often?   No     Have you ever received therapy or stayed in the hospital for mental health problems?   Yes, explain: hx of therapy. No hospitalizations.      Have you ever hurt yourself, such as cutting, burning or hitting yourself?   No     Have you ever purged, binged or restricted yourself as a way to control your weight?   Yes, explain: \"I just wouldn't eat for a while. A lot of that is from my break up.\"     Are you on a special diet?   No     Do you have any concerns regarding your nutritional status?   No     Have you had any appetite changes in the last 3 months?   No   Have you " "had weight loss or weight gain of more than 10 lbs in the last 3 months?   If patient gained or lost more than 10 lbs, then refer to program RN / attending Physician for assessment.   Yes, explain: gained weight.   Was the patient informed of BMI?    Normal, No Intervention   Yes   Have you engaged in any risk-taking behavior that would put you at risk for exposure to blood-borne or sexually transmitted diseases?   No   Do you have any dental problems?   No   Have you ever lived through any trauma or stressful life events?   Yes, explain: \"I always had anxiety and depression. I do have on both sides of my family, depression and anxiety. My grandma has it really bad. I have it really bad. My dad's chase of the family has dealt with a lot of alcoholism. I have recently gone through a big down in my life. I was living with my boyfriend in [place]. He had to go back to [place] and we broke up. I lost my job. I was about to lose my apartment the next month. My best friend's dad . My parents were moving away. He found a girlfriend right away. I went to drinking to numb the pain and I couldn't sleep at night.\"   In the past month, have you had any of the following symptoms related to the trauma listed above? (dreams, intense memories, flashbacks, physical reactions, etc.)   Yes, explain: \"dreams and flashbacks.\"   Have you ever believed people were spying on you, or that someone was plotting against you or trying to hurt you?   No   Have you ever believed someone was reading your mind or could hear your thoughts or that you could actually read someone's mind or hear what another person was thinking?   No   Have you ever believed that someone of some force outside of yourself was putting thoughts into your mind or made you act in a way that was not your usual self?  Have you ever though you were possessed?   No   Have you ever believed you were being sent special messages through the TV, radio or newspaper?   No   Have you " ever heard things other people couldn't hear, such as voices or other noises?   No   Have you ever had visions when you were awake?  Or have you ever seen things other people couldn't see?   No   Do you have a valid 's license?    Yes     PHQ-9, BRITT-7 and Suicide Risk Assessment   PHQ-9 on 3/26/2020 BRITT-7 on 3/26/2020   The patient's PHQ-9 score was 11 out of 27, indicating moderate depression.   The patient's BRITT-7 score was 7 out of 21, indicating mild anxiety.       [place]-Suicide Severity Rating Scale   Suicide Ideation   1.) Have you ever wished you were dead or that you could go to sleep and not wake up?     Lifetime:  No   Past Month:  No     2.) Have you actually had any thoughts of killing yourself?   Lifetime:  No   Past Month:  No     3.) Have you been thinking about how you might do this?     Lifetime:  No   Past Month:  No     4.) Have you had these thoughts and had some intention of acting on them?     Lifetime:  No   Past Month:  No     5.) Have you started to work out the details of how to kill yourself?   Lifetime:  No   Past Month:  No     6.) Do you intend to carry out this plan?      Lifetime:  No   Past Month:  No     Intensity of Ideation   Intensity of ideation (1 being least severe, 5 being most severe):     Lifetime:  The patient denied ever having any suicidal thoughts in life.   Past Month:  The patient denied ever having any suicidal thoughts in life.     How often do you have these thoughts?  The patient denied ever having any suicidal thoughts in life.     When you have the thoughts how long do they last?  The patient denied ever having any suicidal thoughts in life.     Can you stop thinking about killing yourself or wanting to die if you want to?  The patient denied ever having any suicidal thoughts in life.     Are there things - anyone or anything (i.e. family, Faith, pain of death) that stopped you from wanting to die or acting on thoughts of suicide?  Does not apply      What sort of reasons did you have for thinking about wanting to die or killing yourself (ie end pain, stop how you were feeling, get attention or reaction, revenge)?  Does not apply     Suicidal Behavior   (Suicide Attempt) - Have you made a suicide attempt?     Lifetime:  The patient had never made a suicide attempt.   Past Month:  The patient had never made a suicide attempt.     Have you engaged in self-harm (non-suicidal self-injury)?  The patient denied having any history of engaging in self-harm (non-suicidal self-injury).     (Interrupted Attempt) - Has there been a time when you started to do something to end your life but someone or something stopped you before you actually did anything?  No     (Aborted or Self-Interrupted Attempt) - Has there been a time when you started to do something to try to end your life but you stopped yourself before you actually did anything?  No     (Preparatory Acts of Behavior) - Have you taken any steps towards making suicide attempt or preparing to kill yourself (such as collecting pills, getting a gun, giving valuables away or writing a suicide note)?  No     Actual Lethality/Medical Damage:  The patient denied ever making a suicidal attempt.       2008  [employer]  Used with permission by Gina Moore, PhD.       Guide to C-SSRS Risk Ratings   NO IDEATION:  with no active thoughts IDEATION: with a wish to die. IDEATION: with active thoughts. Risk Ratings   If Yes No No 0 - Very Low Risk   If NA Yes No 1 - Low Risk   If NA Yes Yes 2 - Low/moderate risk   IDEATION: associated thoughts of methods without intent or plan INTENT: Intent to follow through on suicide PLAN: Plan to follow through on suicide Risk Ratings cont...   If Yes No No 3 - Moderate Risk   If Yes Yes No 4 - High Risk   If Yes Yes Yes 5 - High Risk   The patient's ADDITIONAL RISK FACTORS and lack of PROTECTIVE FACTORS may increase their overall suicide risk ratings.  "    Additional Risk Factors:    Significant history of having untreated or poorly treated mental health symptoms     A recent death of someone close to the patient and/or unresolved grief and loss issues     A recent loss that was significant to the patient, i.e. loss of job, loss of home, divorce, break-up, etc.     History of impulsive or aggressive behaviors   Protective Factors:    Having people in his/her life that would prevent the patient from considering a suicide attempt (i.e. young children, spouse, parents, etc.)     An absence of chronic health problems or stable and well treated chronic health issues     A positive relationship with his/her clinical medical and/or mental health providers     Having easy access to supportive family members     Having a good community support network     Risk Status   Past month: 0. - Very Low Risk:  Evaluation Counselors:  Document in Epic / S B E to counselor \"Very Low Risk\".      Treatment Counselors:  Reassess upon admission as applicable, assess weekly in progress notes under Dim[facility] and summarize in Discharge / Treatment summary under [facility].    Past 24 hours: 0. - Very Low Risk:  Evaluation Counselors:  Document in Epic / S B E to counselor \"Very Low Risk\".      Treatment Counselors:  Reassess upon admission as applicable, assess weekly in progress notes under Dim[facility] and summarize in Discharge / Treatment summary under [facility].   Additional information to support suicide risk rating: There was no additional information to provide at this time.     Mental Health Status   Physical Appearance/Attire: Appears stated age   Hygiene: well groomed   Eye Contact: at examiner   Speech Rate:  regular   Speech Volume: regular   Speech Quality: fluid   Cognitive/Perceptual:  reality based   Cognition: memory intact    Judgment: intact   Insight: intact   Orientation:  time, place, person and situation   Thought: logical    Hallucinations:  none   General " Behavioral Tone: cooperative   Psychomotor Activity: no problem noted   Gait:  no problem   Mood: appropriate   Affect: congruence/appropriate   Counselor Notes: NA     Criteria for Diagnosis: DSM-5 Criteria for Substance Use Disorders      Alcohol Use Disorder Severe - 303.90 (F10.20)  Depression NOS, per patient self-report  Anxiety disorder NOS, per patient self-report    Level of Care   I.) Intoxication and Withdrawal: 0   II.) Biomedical:  0   III.) Emotional and Behavioral:  2   IV.) Readiness to Change:  1   V.) Relapse Potential: 3   VI.) Recovery Environmental: 3     Initial Problem List     The patient lacks relapse prevention skills  The patient has poor coping skills  The patient has poor refusal skills   The patient lacks a sober peer support network  The patient has a tendency to isolate  The patient has dual issues of MI and CD  The patient lacks the ability to effectively manage his/her mental health issues  The patient has a significant history of grief and loss issues    Patient/Client is willing to follow treatment recommendations.  Yes    Counselor: NADIA Hurtado

## 2020-03-26 NOTE — TELEPHONE ENCOUNTER
Routing refill request to provider for review/approval because:  Drug not on the [facility] refill protocol   Last written on 11/14/19.   Last Office Visit 7/9/18.     Yvonne Lemus RN BSN

## 2020-03-26 NOTE — PROGRESS NOTES
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 follow up questions to determine if the person recognizes triggers and if the person has had any success in dealing with them.     In the past 30 days, on a scale of 0-10 (0 least severe to 10 being most severe, how would you rate your cravings?  Alcohol: 7    3. Have you been treated for alcohol/other drug abuse/dependence? No    4. Support group participation: Have you/do you attend support group meetings to reduce/stop your alcohol/drug use? How recently? What was your experience? Are you willing to restart? If the person has not participated, is he or she willing?     She has not been to. \"I can't do group talks. I can do ones. I don't like the idea of AA.\"    5. What would assist you in staying sober/straight?     \"I think it is a mind thing. I need to keep reminding myself of all I have and how I feel when I don't drink. And not buy the bottle in the first place.\"    Dimension V Ratings   Relapse/Continued Use/Continued problem potential - The placing authority must use the criteria in Dimension V to determine a client s relapse, continued use, and continued problem potential.   RISK DESCRIPTIONS - Severity rating: 3 Client has poor recognition and understanding of relapse and recidivism issues and displays moderately high vulnerability for further substance use or mental health problems. Client " "has few coping skills and rarely applies coping skills.    REASONS SEVERITY WAS ASSIGNED - (What information did the person provide that indicates his or her understanding of relapse issues? What about the person s experience indicates how prone he or she is to relapse? What coping skills does the person have that decrease relapse potential?)      Pt denies having ever attended a cd treatment program, a detox program, or a 12 step meeting before. Pt lacks education into her disease of addiction. In the past 30 days, pt rates her cravings for alcohol as a 7 on the 0-10 Craving Scale. Pt identified her relapse triggers as being around friends who are drinking or being depressed.  Pt lacks impulse control and long-term sober maintenance skills.  Pt is at a moderate high risk for continued use.         DIMENSION VI - Recovery Environment   1. Are you employed/attending school? Tell me about that.     Pt is currently unemployed and she last worked in June 2019 as a .    2A. Describe a typical day; evening for you. Work, school, social, leisure, volunteer, spiritual practices. Include time spent obtaining, using, recovering from drugs or alcohol. (DSM)     Drinking: \"depends on if I am with people or by myself. Lately more by myself and it is more causal. I will be watching tv in my room by myself.\"    Please describe what leisure activities have been associated with your substance abuse:     \"Watch tv, I will call my best friend. Reading, going outside actually. I think there is more of a confidence when I am drinking.\"    2B. How often do you spend more time than you planned using or use more than you planned? (DSM)     \"in a week? I don't have days that I don't intend to drink. It just happens. I have been good with not buying it so I don't even have it there. Last night my roommate offered it to me when we were doing a puzzle.\"    3. How important is using to your social connections? Do many of your " "family or friends use?     Most of her friends drink.    4A. Are you currently in a significant relationship?     No    4C. Sexual Orientation:     Heterosexual    5A. Who do you live with?      With her godmother's niece    5B. Tell me about their alcohol/drug use and mental health issues.     \"She will have a beer. She is not a heavy drinker.\"    5C. Are you concerned for your safety there? No    5D. Are you concerned about the safety of anyone else who lives with you? No    6A. Do you have children who live with you?     The patient denied having any children.    6B. Do you have children who do not live with you?     The patient denied having any children.    7A. Who supports you in making changes in your alcohol or drug use? What are they willing to do to support you? Who is upset or angry about you making changes in your alcohol or drug use? How big a problem is this for you?      \"mom and dad and my best friend.\"    7B. This table is provided to record information about the person s relationships and available support It is not necessary to ask each item; only to get a comprehensive picture of their support system.  How often can you count on the following people when you need someone?   Partner / Spouse The patient does not have a current partner or spouse.   Parent(s)/Aunt(s)/Uncle(s)/Grandparents Always supportive   Sibling(s)/Cousin(s) Usually supportive   Child(romy) The patient doesn't have any children.   Other relative(s) Usually supportive   Friend(s)/neighbor(s) Always supportive   Child(romy) s father(s)/mother(s) The patient doesn't have any children.   Support group member(s) The patient denied having any current involvement with 12-step or other support group meetings.   Community of christian members The patient denied having any current involvement with community christian members.   /counselor/therapist/healer The patient denied having any current involvement with a , " "counselor, therapist or healer.   Other (specify) No     8A. What is your current living situation?     She lives with her godmother's niece.    8B. What is your long term plan for where you will be living?     \"hopefully if I get a job, my plan is to get a one bedroom apartment by myself.\"    8C. Tell me about your living environment/neighborhood? Ask enough follow up questions to determine safety, criminal activity, availability of alcohol and drugs, supportive or antagonistic to the person making changes.      No concerns    9. Criminal justice history: Gather current/recent history and any significant history related to substance use--Arrests? Convictions? Circumstances? Alcohol or drug involvement? Sentences? Still on probation or parole? Expectations of the court? Current court order? Any sex offenses - lifetime? What level? (DSM)    None    10. What obstacles exist to participating in treatment? (Time off work, childcare, funding, transportation, pending long term time, living situation)     The patient denied having any obstacles for participating in substance abuse treatment.    Dimension VI Ratings   Recovery environment - The placing authority must use the criteria in Dimension VI to determine a client s recovery environment.   RISK DESCRIPTIONS - Severity rating: 3 Client is not engaged in structured, meaningful activity and the client's peers, family, significant other, and living environment are unsupportive, or there is significant criminal justice system involvement.    REASONS SEVERITY WAS ASSIGNED - (What support does the person have for making changes? What structure/stability does the person have in his or her daily life that will increase the likelihood that changes can be sustained? What problems exist in the person s environment that will jeopardize getting/staying clean and sober?)     The patient currently lives with her godmother's niece for the past 4 months. The patient denied having any " "concerns regarding her immediate living environment or neighborhood. The patient reported having a relationship conflict with \"dad, my mom\" due to her ongoing substance abuse issues. The patient identified as being hetersexual and she denied being in a romantic relationship at this time. The patient denied having a history of legal issues. The patient is unemployed and she last worked June 2019. The patient reported having some increased financial stress due to not working. The patient lacks a current sober peer support network.         Client Choice/Exceptions   Would you like services specific to language, age, gender, culture, Jewish preference, race, ethnicity, sexual orientation or disability?  No    What particular treatment choices and options would you like to have? Individual therapy    Do you have a preference for a particular treatment program? None    Criteria for Diagnosis     Criteria for Diagnosis  DSM-5 Criteria for Substance Use Disorder  Instructions: Determine whether the client currently meets the criteria for Substance Use Disorder using the diagnostic criteria in the DSM-V pp.481-589. Current means during the most recent 12 months outside a facility that controls access to substances    Category of Substance Severity (ICD-10 Code / DSM 5 Code)     Alcohol Use Disorder Severe  (10.20) (303.90)   Cannabis Use Disorder The patient does not meet the criteria for a Cannabis use disorder.   Hallucinogen Use Disorder The patient does not meet the criteria for a Hallucinogen use disorder.   Inhalant Use Disorder The patient does not meet the criteria for an Inhalant use disorder.   Opioid Use Disorder The patient does not meet the criteria for an Opioid use disorder.   Sedative, Hypnotic, or Anxiolytic Use Disorder The patient does not meet the criteria for a Sedative/Hypnotic use disorder.   Stimulant Related Disorder The patient does not meet the criteria for a Stimulant use disorder.   Tobacco " Use Disorder The patient does not meet the criteria for a Tobacco use disorder.   Other (or unknown) Substance Use Disorder The patient does not meet the criteria for a Other (or unknown) Substance use disorder.       Collateral Contact Summary   Number of contacts made: 1    Contact with referring person:  No    If court related records were reviewed, summarize here: No court records had been reviewed at the time of this documentation.    Information from collateral contacts supported/largely agreed with information from the client and associated risk ratings.      Rule 25 Assessment Summary and Plan   's Recommendation    1)  Attend, at a minimum, 6 individual psychotherapy sessions that offer a chemical dependency component to it.  2)  Abstain from all mood-altering chemicals unless prescribed by a licensed provider.   3)  Attend, at minimum, 2 weekly support group meetings, such as 12 step based (AA/NA), SMART Recovery, Health Realizations, and/or Refuge Recovery meetings.     4)  Actively work with a female mentor/sponsor on a weekly basis.   5)  Follow all the recommendations of your treatment/medical providers.  6)  Ideally, pt should attend at a minimum a MICD IOP treatment program, however, at this time she is not willing to attend a group setting. If pt's therapist believes she needs CD treatment, she is willing to complete an updated CD evaluation, and then attend IOP CD treatment.         Collateral Contacts     Name:    [facility]   Relationship:    [facility]   Phone Number:    NA Releases:    NA     The patient's medical record at [facility] was reviewed and the information contained in the medical record supported the patient's account of her chemical use history and chemical use consequences.  ollateral Contacts      A problematic pattern of alcohol/drug use leading to clinically significant impairment or distress, as manifested by at least two of the following, occurring within a  12-month period:    1.) Alcohol/drug is often taken in larger amounts or over a longer period than was intended.  2.) There is a persistent desire or unsuccessful efforts to cut down or control alcohol/drug use  3.) A great deal of time is spent in activities necessary to obtain alcohol, use alcohol, or recover from its effects.  4.) Craving, or a strong desire or urge to use alcohol/drug  6.) Continued alcohol use despite having persistent or recurrent social or interpersonal problems caused or exacerbated by the effects of alcohol/drug.  7.) Important social, occupational, or recreational activities are given up or reduced because of alcohol/drug use.  8.) Recurrent alcohol/drug use in situations in which it is physically hazardous.  9.) Alcohol/drug use is continued despite knowledge of having a persistent or recurrent physical or psychological problem that is likely to have been caused or exacerbated by alcohol.  10.) Tolerance, as defined by either of the following: A need for markedly increased amounts of alcohol/drug to achieve intoxication or desired effect.  11.) Withdrawal, as manifested by either of the following: The characteristic withdrawal syndrome for alcohol/drug (refer to Criteria A and B of the criteria set for alcohol/drug withdrawal).      Specify if: In early remission:  After full criteria for alcohol/drug use disorder were previously met, none of the criteria for alcohol/drug use disorder have been met for at least 3 months but for less than 12 months (with the exception that Criterion A4,  Craving or a strong desire or urge to use alcohol/drug  may be met).     In sustained remission:   After full criteria for alcohol use disorder were previously met, non of the criteria for alcohol/drug use disorder have been met at any time during a period of 12 months or longer (with the exception that Criterion A4,  Craving or strong desire or urge to use alcohol/drug  may be met).   Specify if:   This  additional specifier is used if the individual is in an environment where access to alcohol is restricted.    Mild: Presence of 2-3 symptoms  Moderate: Presence of 4-5 symptoms  Severe: Presence of 6 or more symptoms

## 2020-03-27 ASSESSMENT — ANXIETY QUESTIONNAIRES: GAD7 TOTAL SCORE: 7

## 2020-12-13 ENCOUNTER — HEALTH MAINTENANCE LETTER (OUTPATIENT)
Age: 25
End: 2020-12-13

## 2021-04-17 ENCOUNTER — HEALTH MAINTENANCE LETTER (OUTPATIENT)
Age: 26
End: 2021-04-17

## 2021-05-24 ENCOUNTER — RECORDS - HEALTHEAST (OUTPATIENT)
Dept: ADMINISTRATIVE | Facility: CLINIC | Age: 26
End: 2021-05-24

## 2021-09-26 ENCOUNTER — HEALTH MAINTENANCE LETTER (OUTPATIENT)
Age: 26
End: 2021-09-26

## 2022-02-09 ENCOUNTER — OFFICE VISIT (OUTPATIENT)
Dept: FAMILY MEDICINE | Facility: CLINIC | Age: 27
End: 2022-02-09
Payer: COMMERCIAL

## 2022-02-09 VITALS
TEMPERATURE: 98.2 F | DIASTOLIC BLOOD PRESSURE: 93 MMHG | WEIGHT: 171.2 LBS | RESPIRATION RATE: 20 BRPM | SYSTOLIC BLOOD PRESSURE: 127 MMHG | HEART RATE: 106 BPM | OXYGEN SATURATION: 97 % | BODY MASS INDEX: 27.63 KG/M2

## 2022-02-09 DIAGNOSIS — F51.05 INSOMNIA DUE TO OTHER MENTAL DISORDER: Primary | ICD-10-CM

## 2022-02-09 DIAGNOSIS — F99 INSOMNIA DUE TO OTHER MENTAL DISORDER: Primary | ICD-10-CM

## 2022-02-09 DIAGNOSIS — J06.9 VIRAL URI WITH COUGH: ICD-10-CM

## 2022-02-09 DIAGNOSIS — F32.1 CURRENT MODERATE EPISODE OF MAJOR DEPRESSIVE DISORDER, UNSPECIFIED WHETHER RECURRENT (H): ICD-10-CM

## 2022-02-09 DIAGNOSIS — F41.9 ANXIETY: ICD-10-CM

## 2022-02-09 DIAGNOSIS — J45.20 INTERMITTENT ASTHMA, UNCOMPLICATED: ICD-10-CM

## 2022-02-09 PROCEDURE — 99204 OFFICE O/P NEW MOD 45 MIN: CPT | Mod: GC | Performed by: STUDENT IN AN ORGANIZED HEALTH CARE EDUCATION/TRAINING PROGRAM

## 2022-02-09 RX ORDER — GUAIFENESIN 200 MG/10ML
200 LIQUID ORAL EVERY 4 HOURS PRN
Qty: 236 ML | Refills: 0 | Status: SHIPPED | OUTPATIENT
Start: 2022-02-09

## 2022-02-09 RX ORDER — FLUTICASONE PROPIONATE 50 MCG
1 SPRAY, SUSPENSION (ML) NASAL DAILY
Qty: 9.9 ML | Refills: 0 | Status: SHIPPED | OUTPATIENT
Start: 2022-02-09

## 2022-02-09 RX ORDER — PROPRANOLOL HYDROCHLORIDE 10 MG/1
10 TABLET ORAL 2 TIMES DAILY PRN
Qty: 60 TABLET | Refills: 1 | Status: SHIPPED | OUTPATIENT
Start: 2022-02-09

## 2022-02-09 RX ORDER — BENZONATATE 100 MG/1
100 CAPSULE ORAL 3 TIMES DAILY PRN
Qty: 30 CAPSULE | Refills: 0 | Status: SHIPPED | OUTPATIENT
Start: 2022-02-09

## 2022-02-09 RX ORDER — ACETAMINOPHEN 500 MG
500-1000 TABLET ORAL EVERY 6 HOURS PRN
Qty: 60 TABLET | Refills: 0 | Status: SHIPPED | OUTPATIENT
Start: 2022-02-09

## 2022-02-09 RX ORDER — IBUPROFEN 400 MG/1
400 TABLET, FILM COATED ORAL EVERY 6 HOURS PRN
Qty: 60 TABLET | Refills: 0 | Status: SHIPPED | OUTPATIENT
Start: 2022-02-09

## 2022-02-09 RX ORDER — BUDESONIDE AND FORMOTEROL FUMARATE DIHYDRATE 80; 4.5 UG/1; UG/1
2 AEROSOL RESPIRATORY (INHALATION) 2 TIMES DAILY
Qty: 20.4 G | Refills: 11 | Status: SHIPPED | OUTPATIENT
Start: 2022-02-09

## 2022-02-09 RX ORDER — MIRTAZAPINE 15 MG/1
15 TABLET, FILM COATED ORAL AT BEDTIME
Qty: 60 TABLET | Refills: 1 | Status: SHIPPED | OUTPATIENT
Start: 2022-02-09

## 2022-02-09 RX ORDER — SERTRALINE HYDROCHLORIDE 100 MG/1
100 TABLET, FILM COATED ORAL DAILY
Qty: 60 TABLET | Refills: 1 | Status: SHIPPED | OUTPATIENT
Start: 2022-02-09

## 2022-02-09 NOTE — PROGRESS NOTES
Preceptor Attestation:    I discussed the patient with the resident and evaluated the patient in person. I have verified the content of the note, which accurately reflects my assessment of the patient and the plan of care.   Supervising Physician:  Jeff Jacques MD.

## 2022-02-09 NOTE — PROGRESS NOTES
[facility] Visit    Assessment & Plan   1. Intermittent asthma, uncomplicated  History of only using albuterol intermittently, thus discontinued the albuterol and sent prescription for Symbicort based on ISIDRO guidelines.  - budesonide-formoterol (SYMBICORT) 80-4.5 MCG/ACT Inhaler; Inhale 2 puffs into the lungs 2 times daily  Dispense: 20.4 g; Refill: 11    2. Insomnia due to other mental disorder  3. Current moderate episode of major depressive disorder, unspecified whether recurrent (H)  4. Anxiety  I will refill her depression/anxiety medications, but would like to review the chart in the  before refilling her Adderall.  Patient was agreeable to scheduling another visit to discuss this and refill possibly at that time.  - mirtazapine (REMERON) 15 MG tablet; Take 1 tablet (15 mg) by mouth At Bedtime  Dispense: 60 tablet; Refill: 1  - propranolol (INDERAL) 10 MG tablet; Take 1 tablet (10 mg) by mouth 2 times daily as needed  Dispense: 60 tablet; Refill: 1  - sertraline (ZOLOFT) 100 MG tablet; Take 1 tablet (100 mg) by mouth daily  Dispense: 60 tablet; Refill: 1    5. Viral URI with cough  Suspect viral etiology based on Covid exposure, but Covid19 test at testing site was negative  (did the test yesterday at [facility] and came back negative today).  We will treat symptoms, but did discuss that if symptoms are ongoing for another week she should return for treatment for bacterial sinusitis.  She is agreeable with this plan.  - fluticasone (FLONASE) 50 MCG/ACT nasal spray; Spray 1 spray into both nostrils daily  Dispense: 9.9 mL; Refill: 0  - benzonatate (TESSALON) 100 MG capsule; Take 1 capsule (100 mg) by mouth 3 times daily as needed for cough  Dispense: 30 capsule; Refill: 0  - guaiFENesin (ROBITUSSIN) 100 MG/5ML liquid; Take 10 mLs (200 mg) by mouth every 4 hours as needed for cough  Dispense: 236 mL; Refill: 0  - acetaminophen (TYLENOL) 500 MG tablet; Take 1-2 tablets  "(500-1,000 mg) by mouth every 6 hours as needed for mild pain  Dispense: 60 tablet; Refill: 0  - ibuprofen (ADVIL/MOTRIN) 400 MG tablet; Take 1 tablet (400 mg) by mouth every 6 hours as needed for moderate pain  Dispense: 60 tablet; Refill: 0       Options for treatment and follow-up care were reviewed with the patient who was engaged and actively involved in the decision making process, verbalized understanding of the options discussed, and satisfied with the final plan.    Patient was staffed with supervising physician, Dr. Jacques.     Toshia Reese MD, PGY3  [facility]    Subjective   Trixie Hernandez is a 27 year old female with a history including asthma, who presents for cough and cold symptoms.    Only uses albuterol for mild intermittent asthma.     1. Acute Illness   Concerns: cough and cold symptoms for three days  When did it start? Three days ago  Is it getting better, worse or staying the same? worse    Fatigue/Achiness?: YES     Fever?: No     Chills/Sweats?:  YES     Headache (location?) YES     Sinus Pressure?: YES     Eye redness/Discharge?: No     Ear Pain?: No     Runny nose?:  YES     Congestion?:  YES     Sore Throat?:  YES   Respiratory    Cough?:  YES-non-productive    Wheeze?:  YES   GI/    Decreased Appetite?:  YES     Nausea?: No     Vomiting?: No     Diarrhea?:  No     Dysuria/Frequency?.:No     Any Illness Exposure?:  YES - coworker tested positive    Any foreign travel or contact with anyone ill who travelled abroad? No     Therapies Tried and outcome:  mucinex, ibuprofen, fluids    2. Mood  Patient is new to this clinic. Previously saw Dr. Reyes. Patient has a long-standing history of depression/anxiety and ADHD. Reportedly has been on medication for these diagnoses since \"middle school.\"  She has been on sertraline (she thinks 150 mg, but dispense report is 100 mg).  Also reports Remeron 15 mg, propranolol 10 mg (maybe only takes this twice a week).  She " does not take OCP anymore, does not take Ambien.  I cleaned up the medication list here.  Most out of her sertraline and ADHD medication.  She is okay scheduling another appointment to discuss her ADHD and Adderall.    Patient Active Problem List    Diagnosis Date Noted     Peritoneal abscess (H) 08/10/2015     Priority: Medium     Peritonsillar abscess 08/10/2015     Priority: Medium     ADHD (attention deficit hyperactivity disorder) 12/24/2010     Priority: Medium     Mild major depression (H) 12/24/2010     Priority: Medium     Intermittent asthma 06/18/2010     Priority: Medium     Sports induced asthma  Managed with inhaler       Anxiety 05/26/2010     Priority: Medium     Specific academic or work inhibition as adjustment reaction 05/26/2010     Priority: Medium     Irregular menses 05/26/2010     Priority: Medium       Social: She reports that she has never smoked. She has never used smokeless tobacco. She reports current alcohol use. She reports that she does not use drugs.    There are no exam notes on file for this visit.    Objective     Vitals:    02/09/22 1617   BP: (!) 127/93   Pulse: 106   Resp: 20   Temp: 98.2  F (36.8  C)   TempSrc: Oral   SpO2: 97%   Weight: 77.7 kg (171 lb 3.2 oz)     Body mass index is 27.63 kg/m .    GEN: NAD, healthy, alert  Constitutional: Awake, alert, cooperative, no acute distress, and appears stated age.  HEENT: NC/AT, EOMI, normal conjunctivae/sclerae, clear oropharynx, MMM  Eyes: Sclera clear, conjunctiva normal.  ENT: TMs mildly erythematous bilaterally. Nasal turbinates erythematous, crusted discharge. Tonsils nonerythematous and without exudates or trismus.  Neck: Supple, symmetrical, trachea midline. No submandibular LAD.  Back: Symmetric, no curvature  Lungs: No increased WOB. CTABL, no crackles or wheezing appreciated.  Cardiovascular: Appears well perfused. RRR, normal S1 and S2, no S3 or S4, and no murmur appreciated.  Abdomen: Normal BS, soft, non-distended,  non-tender, no masses palpated  Musculoskeletal: No redness, warmth, or swelling of the joints. Tone is normal.  Neurologic: A&Ox3.  Cranial nerves II-XII are grossly intact.  Sensory is intact and gait is normal.  Neuropsychiatric: Normal affect, mood, orientation, memory and insight.  Skin: No visible rashes, erythema, pallor, petechia or purpura.

## 2022-02-10 ENCOUNTER — TELEPHONE (OUTPATIENT)
Dept: FAMILY MEDICINE | Facility: CLINIC | Age: 27
End: 2022-02-10
Payer: COMMERCIAL

## 2022-02-10 NOTE — TELEPHONE ENCOUNTER
[facility] phone call message- medication clarification/question:    Full Medication Name: budesonide-formoterol (SYMBICORT) 80-4.5 MCG/ACT Inhaler    Pt's insurance will not pay for the above medication at the same time as the other inhaler unless they receive a prior authorization . The Pharmacy wanted if they should fill and bill both medications as is or if they should send the Doctor a prior authorization.     Pharmacy confirmed as [id number] [address]: Yes    OK to leave a message on voice mail? Yes    Primary language: English      needed? No    Call taken on February 10, 2022 at 9:29 AM by Pola Paris

## 2022-03-01 ENCOUNTER — OFFICE VISIT (OUTPATIENT)
Dept: FAMILY MEDICINE | Facility: CLINIC | Age: 27
End: 2022-03-01
Payer: COMMERCIAL

## 2022-03-01 VITALS
TEMPERATURE: 98.8 F | HEART RATE: 105 BPM | WEIGHT: 165.6 LBS | SYSTOLIC BLOOD PRESSURE: 126 MMHG | OXYGEN SATURATION: 94 % | RESPIRATION RATE: 22 BRPM | DIASTOLIC BLOOD PRESSURE: 96 MMHG | BODY MASS INDEX: 26.73 KG/M2

## 2022-03-01 DIAGNOSIS — F90.2 ATTENTION DEFICIT HYPERACTIVITY DISORDER (ADHD), COMBINED TYPE: Primary | ICD-10-CM

## 2022-03-01 PROCEDURE — 99214 OFFICE O/P EST MOD 30 MIN: CPT | Mod: GC | Performed by: STUDENT IN AN ORGANIZED HEALTH CARE EDUCATION/TRAINING PROGRAM

## 2022-03-01 RX ORDER — DEXTROAMPHETAMINE SACCHARATE, AMPHETAMINE ASPARTATE, DEXTROAMPHETAMINE SULFATE AND AMPHETAMINE SULFATE 7.5; 7.5; 7.5; 7.5 MG/1; MG/1; MG/1; MG/1
30 TABLET ORAL 2 TIMES DAILY
Qty: 60 TABLET | Refills: 0 | Status: SHIPPED | OUTPATIENT
Start: 2022-03-01 | End: 2022-04-19

## 2022-03-01 ASSESSMENT — ANXIETY QUESTIONNAIRES
3. WORRYING TOO MUCH ABOUT DIFFERENT THINGS: MORE THAN HALF THE DAYS
5. BEING SO RESTLESS THAT IT IS HARD TO SIT STILL: NOT AT ALL
7. FEELING AFRAID AS IF SOMETHING AWFUL MIGHT HAPPEN: NOT AT ALL
7. FEELING AFRAID AS IF SOMETHING AWFUL MIGHT HAPPEN: NOT AT ALL
6. BECOMING EASILY ANNOYED OR IRRITABLE: NOT AT ALL
4. TROUBLE RELAXING: SEVERAL DAYS
GAD7 TOTAL SCORE: 7
5. BEING SO RESTLESS THAT IT IS HARD TO SIT STILL: NOT AT ALL
1. FEELING NERVOUS, ANXIOUS, OR ON EDGE: MORE THAN HALF THE DAYS
2. NOT BEING ABLE TO STOP OR CONTROL WORRYING: MORE THAN HALF THE DAYS
GAD7 TOTAL SCORE: 7
3. WORRYING TOO MUCH ABOUT DIFFERENT THINGS: MORE THAN HALF THE DAYS
2. NOT BEING ABLE TO STOP OR CONTROL WORRYING: MORE THAN HALF THE DAYS
6. BECOMING EASILY ANNOYED OR IRRITABLE: NOT AT ALL
1. FEELING NERVOUS, ANXIOUS, OR ON EDGE: MORE THAN HALF THE DAYS

## 2022-03-01 ASSESSMENT — ASTHMA QUESTIONNAIRES: ACT_TOTALSCORE: 22

## 2022-03-01 ASSESSMENT — PATIENT HEALTH QUESTIONNAIRE - PHQ9
SUM OF ALL RESPONSES TO PHQ QUESTIONS 1-9: 6
5. POOR APPETITE OR OVEREATING: SEVERAL DAYS

## 2022-03-01 NOTE — PROGRESS NOTES
Preceptor Attestation:    I discussed the patient with the resident and evaluated the patient in person. I have verified the content of the note, which accurately reflects my assessment of the patient and the plan of care.   Supervising Physician:  Neftali Colin MD.

## 2022-03-01 NOTE — PROGRESS NOTES
"[facility] Clinic Visit    Assessment & Plan   1. Attention deficit hyperactivity disorder (ADHD), combined type  Will see in one month and work on getting her set up for formal testing. Will start with just 30mg BID and assess response at next visit.   - amphetamine-dextroamphetamine (ADDERALL) 30 MG tablet; Take 1 tablet (30 mg) by mouth 2 times daily  Dispense: 60 tablet; Refill: 0       Options for treatment and follow-up care were reviewed with the patient who was engaged and actively involved in the decision making process, verbalized understanding of the options discussed, and satisfied with the final plan.    Patient was staffed with supervising physician, Dr. Colin.     Toshia Reese MD, PGY3  [facility]    Subjective   Trixie Hernandez is a 27 year old female with a history including anxeity, depression, intermittent asthma, ADHD, who presents for medication refill.     Patient is new to our clinic. First visit was on 2/9/22. Requested refills of her depression/anxiety medications as well as Adderall. I did refill mirtazapine, propranolol, and sertraline, but not the adderall.     She has been on Adderall since she was age 16. She was diagnosed at a specialty clinic for ADD in [facility]. She cannot remember the name of the clinic but previous PCP, Dr. Reyes had been prescribing for the last 10 years. This was her mom's PCP and why she kept seeing her. This clinic is closer (7 minutes away) and she has new insurance.     She was in [place] for awhile for school, then a year after for work and wasn't able to make it down to the [facility] to see Dr. Reyes as much as she would like. Last dose appears to be 3/8/21 per PDMP review.     In , she had tried behavioral interventions with counselors before starting medications.     She has been getting written up at work due to concentration. She has trouble completing tasks. She describes \"looking at someone when they " "are talking, but she doesn't hear anything.\" She knows she needs to get on task but feels \"stuck.\" She is an .     She drinks alcohol on the weekends. No history of drug use. No history of gambling, spending, shopping. No other stressors with or recent family deaths. Nonsmoker, never smoked.     Patient Active Problem List    Diagnosis Date Noted     Peritoneal abscess (H) 08/10/2015     Priority: Medium     Peritonsillar abscess 08/10/2015     Priority: Medium     ADHD (attention deficit hyperactivity disorder) 12/24/2010     Priority: Medium     Mild major depression (H) 12/24/2010     Priority: Medium     Intermittent asthma 06/18/2010     Priority: Medium     Sports induced asthma  Managed with inhaler       Anxiety 05/26/2010     Priority: Medium     Specific academic or work inhibition as adjustment reaction 05/26/2010     Priority: Medium     Irregular menses 05/26/2010     Priority: Medium       Social: She reports that she has never smoked. She has never used smokeless tobacco. She reports current alcohol use. She reports that she does not use drugs.    There are no exam notes on file for this visit.    Objective     Vitals:    03/01/22 1409 03/01/22 1411   BP: (!) 132/93 (!) 126/96   BP Location:  Right arm   Patient Position:  Sitting   Cuff Size:  Adult Regular   Pulse: 103 105   Resp: 22    Temp: 98.8  F (37.1  C)    TempSrc: Oral    SpO2: 94%    Weight: 75.1 kg (165 lb 9.6 oz)      Body mass index is 26.73 kg/m .    GEN: NAD, healthy, alert  Constitutional: Awake, alert, cooperative, no acute distress, and appears stated age.  HEENT: NC/AT, EOMI, normal conjunctivae/sclerae, mask  Lungs: No increased WOB. CTABL, no crackles or wheezing appreciated.  Cardiovascular: Appears well perfused. RRR, normal S1 and S2, no S3 or S4, and no murmur appreciated.  Abdomen: Normal BS, soft, non-distended, non-tender, no masses palpated  Musculoskeletal: No redness, warmth, or swelling of the " joints. Tone is normal.  Neurologic: A&Ox3.  Cranial nerves II-XII are grossly intact.  Sensory is intact and gait is normal.  Neuropsychiatric: Normal affect, mood, orientation, memory and insight.  Skin: No visible rashes, erythema, pallor, petechia or purpura.

## 2022-03-02 ASSESSMENT — ANXIETY QUESTIONNAIRES: GAD7 TOTAL SCORE: 7

## 2022-04-19 DIAGNOSIS — F90.2 ATTENTION DEFICIT HYPERACTIVITY DISORDER (ADHD), COMBINED TYPE: ICD-10-CM

## 2022-04-19 RX ORDER — DEXTROAMPHETAMINE SACCHARATE, AMPHETAMINE ASPARTATE, DEXTROAMPHETAMINE SULFATE AND AMPHETAMINE SULFATE 7.5; 7.5; 7.5; 7.5 MG/1; MG/1; MG/1; MG/1
30 TABLET ORAL 2 TIMES DAILY
Qty: 60 TABLET | Refills: 0 | Status: SHIPPED | OUTPATIENT
Start: 2022-04-19 | End: 2022-05-25

## 2022-05-08 ENCOUNTER — HEALTH MAINTENANCE LETTER (OUTPATIENT)
Age: 27
End: 2022-05-08

## 2022-08-15 ENCOUNTER — TELEPHONE (OUTPATIENT)
Dept: FAMILY MEDICINE | Facility: CLINIC | Age: 27
End: 2022-08-15

## 2022-08-15 ENCOUNTER — MYC REFILL (OUTPATIENT)
Dept: FAMILY MEDICINE | Facility: CLINIC | Age: 27
End: 2022-08-15

## 2022-08-15 DIAGNOSIS — F90.2 ATTENTION DEFICIT HYPERACTIVITY DISORDER (ADHD), COMBINED TYPE: ICD-10-CM

## 2022-08-15 RX ORDER — DEXTROAMPHETAMINE SACCHARATE, AMPHETAMINE ASPARTATE, DEXTROAMPHETAMINE SULFATE AND AMPHETAMINE SULFATE 7.5; 7.5; 7.5; 7.5 MG/1; MG/1; MG/1; MG/1
30 TABLET ORAL 2 TIMES DAILY
Qty: 60 TABLET | Refills: 0 | Status: SHIPPED | OUTPATIENT
Start: 2022-08-15

## 2022-08-15 NOTE — TELEPHONE ENCOUNTER
[facility] phone call message- general phone call:    Reason for call: She wanted to let  know she made her appt with him so he can send over her medication    Action desired: call back    Return call needed: Yes    OK to leave a message on voice mail? Yes    Advised patient to response may take up to 2 business days: Yes    Primary language: English      needed? No    Call taken on August 15, 2022 at 4:11 PM by Melina Garg

## 2023-04-15 ENCOUNTER — MYC REFILL (OUTPATIENT)
Dept: FAMILY MEDICINE | Facility: CLINIC | Age: 28
End: 2023-04-15
Payer: COMMERCIAL

## 2023-04-15 DIAGNOSIS — F90.2 ATTENTION DEFICIT HYPERACTIVITY DISORDER (ADHD), COMBINED TYPE: ICD-10-CM

## 2023-04-18 RX ORDER — DEXTROAMPHETAMINE SACCHARATE, AMPHETAMINE ASPARTATE, DEXTROAMPHETAMINE SULFATE AND AMPHETAMINE SULFATE 7.5; 7.5; 7.5; 7.5 MG/1; MG/1; MG/1; MG/1
30 TABLET ORAL 2 TIMES DAILY
Qty: 60 TABLET | Refills: 0 | OUTPATIENT
Start: 2023-04-18

## 2023-04-18 NOTE — TELEPHONE ENCOUNTER
Reached out to patient back in November 2022 to schedule ADHD follow up but patient stated that she was being treated elsewhere.

## 2023-04-23 ENCOUNTER — HEALTH MAINTENANCE LETTER (OUTPATIENT)
Age: 28
End: 2023-04-23

## 2023-06-02 ENCOUNTER — HEALTH MAINTENANCE LETTER (OUTPATIENT)
Age: 28
End: 2023-06-02

## 2024-06-30 ENCOUNTER — HEALTH MAINTENANCE LETTER (OUTPATIENT)
Age: 29
End: 2024-06-30